# Patient Record
Sex: MALE | Race: BLACK OR AFRICAN AMERICAN | NOT HISPANIC OR LATINO | Employment: FULL TIME | URBAN - METROPOLITAN AREA
[De-identification: names, ages, dates, MRNs, and addresses within clinical notes are randomized per-mention and may not be internally consistent; named-entity substitution may affect disease eponyms.]

---

## 2021-08-04 ENCOUNTER — LAB VISIT (OUTPATIENT)
Dept: FAMILY MEDICINE | Facility: CLINIC | Age: 36
End: 2021-08-04
Payer: OTHER GOVERNMENT

## 2021-08-04 DIAGNOSIS — R05.9 COUGH: ICD-10-CM

## 2021-08-04 PROCEDURE — U0003 INFECTIOUS AGENT DETECTION BY NUCLEIC ACID (DNA OR RNA); SEVERE ACUTE RESPIRATORY SYNDROME CORONAVIRUS 2 (SARS-COV-2) (CORONAVIRUS DISEASE [COVID-19]), AMPLIFIED PROBE TECHNIQUE, MAKING USE OF HIGH THROUGHPUT TECHNOLOGIES AS DESCRIBED BY CMS-2020-01-R: HCPCS | Performed by: INTERNAL MEDICINE

## 2021-08-04 PROCEDURE — U0005 INFEC AGEN DETEC AMPLI PROBE: HCPCS | Performed by: INTERNAL MEDICINE

## 2021-08-05 LAB
SARS-COV-2 RNA RESP QL NAA+PROBE: NOT DETECTED
SARS-COV-2- CYCLE NUMBER: -1

## 2022-12-16 ENCOUNTER — HOSPITAL ENCOUNTER (EMERGENCY)
Facility: HOSPITAL | Age: 37
Discharge: HOME | End: 2022-12-16
Attending: EMERGENCY MEDICINE

## 2022-12-16 VITALS
HEIGHT: 70 IN | TEMPERATURE: 98 F | DIASTOLIC BLOOD PRESSURE: 51 MMHG | WEIGHT: 165 LBS | BODY MASS INDEX: 23.62 KG/M2 | RESPIRATION RATE: 18 BRPM | OXYGEN SATURATION: 100 % | HEART RATE: 61 BPM | SYSTOLIC BLOOD PRESSURE: 112 MMHG

## 2022-12-16 DIAGNOSIS — R10.30 LOWER ABDOMINAL PAIN: ICD-10-CM

## 2022-12-16 DIAGNOSIS — K40.90 RIGHT INGUINAL HERNIA: Primary | ICD-10-CM

## 2022-12-16 LAB
ALBUMIN SERPL BCP-MCNC: 4.2 G/DL (ref 3.5–5.2)
ALP SERPL-CCNC: 49 U/L (ref 55–135)
ALT SERPL W/O P-5'-P-CCNC: 12 U/L (ref 10–44)
ANION GAP SERPL CALC-SCNC: 8 MMOL/L (ref 8–16)
AST SERPL-CCNC: 15 U/L (ref 10–40)
BILIRUB SERPL-MCNC: 1 MG/DL (ref 0.1–1)
BUN SERPL-MCNC: 15 MG/DL (ref 6–20)
CALCIUM SERPL-MCNC: 9.1 MG/DL (ref 8.7–10.5)
CHLORIDE SERPL-SCNC: 106 MMOL/L (ref 95–110)
CO2 SERPL-SCNC: 25 MMOL/L (ref 23–29)
CREAT SERPL-MCNC: 1.1 MG/DL (ref 0.5–1.4)
ERYTHROCYTE [DISTWIDTH] IN BLOOD BY AUTOMATED COUNT: 16.1 % (ref 11.5–14.5)
EST. GFR  (NO RACE VARIABLE): >60 ML/MIN/1.73 M^2
GLUCOSE SERPL-MCNC: 101 MG/DL (ref 70–110)
HCT VFR BLD AUTO: 42.3 % (ref 40–54)
HGB BLD-MCNC: 13.4 G/DL (ref 14–18)
LACTATE SERPL-SCNC: 1.4 MMOL/L (ref 0.5–2.2)
LIPASE SERPL-CCNC: 19 U/L (ref 4–60)
MCH RBC QN AUTO: 21.8 PG (ref 27–31)
MCHC RBC AUTO-ENTMCNC: 31.7 G/DL (ref 32–36)
MCV RBC AUTO: 69 FL (ref 82–98)
PLATELET # BLD AUTO: 357 K/UL (ref 150–450)
PMV BLD AUTO: 10.2 FL (ref 9.2–12.9)
POTASSIUM SERPL-SCNC: 4.4 MMOL/L (ref 3.5–5.1)
PROT SERPL-MCNC: 7.6 G/DL (ref 6–8.4)
RBC # BLD AUTO: 6.15 M/UL (ref 4.6–6.2)
SODIUM SERPL-SCNC: 139 MMOL/L (ref 136–145)
WBC # BLD AUTO: 9.78 K/UL (ref 3.9–12.7)

## 2022-12-16 PROCEDURE — 83690 ASSAY OF LIPASE: CPT | Performed by: EMERGENCY MEDICINE

## 2022-12-16 PROCEDURE — 25500020 PHARM REV CODE 255: Performed by: EMERGENCY MEDICINE

## 2022-12-16 PROCEDURE — 96361 HYDRATE IV INFUSION ADD-ON: CPT

## 2022-12-16 PROCEDURE — 85027 COMPLETE CBC AUTOMATED: CPT | Performed by: EMERGENCY MEDICINE

## 2022-12-16 PROCEDURE — 83605 ASSAY OF LACTIC ACID: CPT | Performed by: EMERGENCY MEDICINE

## 2022-12-16 PROCEDURE — 96374 THER/PROPH/DIAG INJ IV PUSH: CPT

## 2022-12-16 PROCEDURE — 80053 COMPREHEN METABOLIC PANEL: CPT | Performed by: EMERGENCY MEDICINE

## 2022-12-16 PROCEDURE — 63600175 PHARM REV CODE 636 W HCPCS: Performed by: EMERGENCY MEDICINE

## 2022-12-16 PROCEDURE — 99285 EMERGENCY DEPT VISIT HI MDM: CPT | Mod: 25

## 2022-12-16 PROCEDURE — 96375 TX/PRO/DX INJ NEW DRUG ADDON: CPT

## 2022-12-16 PROCEDURE — 25000003 PHARM REV CODE 250: Performed by: EMERGENCY MEDICINE

## 2022-12-16 RX ORDER — MORPHINE SULFATE 4 MG/ML
4 INJECTION, SOLUTION INTRAMUSCULAR; INTRAVENOUS
Status: COMPLETED | OUTPATIENT
Start: 2022-12-16 | End: 2022-12-16

## 2022-12-16 RX ORDER — ONDANSETRON 2 MG/ML
4 INJECTION INTRAMUSCULAR; INTRAVENOUS
Status: COMPLETED | OUTPATIENT
Start: 2022-12-16 | End: 2022-12-16

## 2022-12-16 RX ADMIN — IOHEXOL 75 ML: 350 INJECTION, SOLUTION INTRAVENOUS at 02:12

## 2022-12-16 RX ADMIN — MORPHINE SULFATE 4 MG: 4 INJECTION INTRAVENOUS at 12:12

## 2022-12-16 RX ADMIN — SODIUM CHLORIDE 1000 ML: 0.9 INJECTION, SOLUTION INTRAVENOUS at 12:12

## 2022-12-16 RX ADMIN — ONDANSETRON 4 MG: 2 INJECTION INTRAMUSCULAR; INTRAVENOUS at 12:12

## 2022-12-16 NOTE — ED NOTES
Patient rates his pain as a 0/10 after receiving IV morphine. Patient denies any nausea at this time. Patient lying in bed, resting comfortably. O2 monitoring in place. Call light in reach, ED visitor @ bedside.

## 2022-12-16 NOTE — DISCHARGE INSTRUCTIONS
Your exam today showed you have an inguinal hernia that reduced (went back in) without need for surgery. If you would like to schedule surgery to prevent the hernia from coming back, call the number to make an appointment.  Come back to the ED if you notice the hernia comes back out and it unable to be pushed back in, for severe abdominal pain, fever, uncontrollable vomiting, or any other concerns.    Thank you for choosing [facility]!     Our goal in the Emergency Department is to always provide outstanding medical care. You may receive a survey by mail or e-mail in the next week regarding your experience today. We would greatly appreciate you completing and returning the survey. Your feedback provides us with a way to recognize our staff who provide very good care, and it helps us learn how to improve when your experience was below our aspiration of excellence.      It is important to remember that some problems are difficult to diagnose and may not be found during your first visit. Be sure to follow up with your primary care doctor and review any labs/imaging that was performed during your visit with them. If you do not have a primary care doctor, you may contact the one listed on your discharge paperwork, or you may also call the [facility] Appointment Desk at [phone] to schedule an appointment.     All medications may potentially have side effects and it is impossible to predict which medications may give you side effects. If you feel that you are having a negative effect of any medication you should immediately stop taking them and seek medical attention.  Do not drive or make any important decisions for 24 hours if you have received any pain medications, sedatives or mood altering drugs during your ER visit.    We appreciate you trusting us with your medical care. We will be happy to take care of you for all of your future medical needs. You may return to the ER at any time for any  new/concerning symptoms, worsening condition, or failure to improve. We hope you feel better soon.     Sincerely,    Kristian Olmos Jr., MD  Board-Certified Emergency Medicine Physician  [facility]

## 2022-12-16 NOTE — ED PROVIDER NOTES
"Encounter Date: 12/16/2022       History     Chief Complaint   Patient presents with    Groin Pain     Right groin pain with " lump" that began 30  in ago, denies any heavy lifting, increased pain with walking      Johnnie Hinton is a 37 y.o. male who  has a past medical history of Asthma.    The patient presents to the ED due to abdominal pain and swelling.   Patient states the pain and swelling started suddenly about 30 min prior to arrival to the ED.  He denies any prior similar episodes. No associated vomiting or fever but does endorse nausea. No prior abdominal surgery. No recent injury. No heavy lifting or strenuous activity.  Endorses allergy to penicillin. No meds taken prior to arrival.       Review of patient's allergies indicates:   Allergen Reactions    Penicillins Hives     Past Medical History:   Diagnosis Date    Asthma      No past surgical history on file.  Family History   Problem Relation Age of Onset    Prostate cancer Neg Hx     Kidney disease Neg Hx      Social History     Tobacco Use    Smoking status: Never   Substance Use Topics    Alcohol use: No    Drug use: No     Review of Systems   Constitutional:  Negative for chills and fever.   HENT:  Negative for sore throat.    Respiratory:  Negative for shortness of breath.    Cardiovascular:  Negative for chest pain.   Gastrointestinal:  Positive for abdominal pain and nausea. Negative for constipation, diarrhea and vomiting.   Genitourinary:  Negative for dysuria, frequency and urgency.   Musculoskeletal:  Negative for back pain.   Skin:  Negative for rash and wound.   Neurological:  Negative for weakness.   Hematological:  Does not bruise/bleed easily.   Psychiatric/Behavioral:  Negative for agitation, behavioral problems and confusion.      Physical Exam     Initial Vitals [12/16/22 1036]   BP Pulse Resp Temp SpO2   (!) 111/52 (!) 56 18 97.5 °F (36.4 °C) 100 %      MAP       --         Physical Exam    Nursing note and vitals " reviewed.  Constitutional: He appears well-developed and well-nourished. He is not diaphoretic. No distress.   HENT:   Head: Normocephalic and atraumatic.   Mouth/Throat: Oropharynx is clear and moist.   Eyes: EOM are normal. Pupils are equal, round, and reactive to light.   Neck: No tracheal deviation present.   Cardiovascular:  Normal rate, regular rhythm, normal heart sounds and intact distal pulses.           Pulmonary/Chest: Breath sounds normal. No stridor. No respiratory distress.   Abdominal: Abdomen is soft. He exhibits no distension and no mass. There is generalized abdominal tenderness. A hernia is present. Hernia confirmed positive in the right inguinal area.   Suprapubic tenderness and bulge.  There is guarding.   Musculoskeletal:         General: No edema. Normal range of motion.     Neurological: He is alert and oriented to person, place, and time. No cranial nerve deficit or sensory deficit.   Skin: Skin is warm and dry. Capillary refill takes less than 2 seconds. No rash noted.   Psychiatric: He has a normal mood and affect. His behavior is normal. Thought content normal.       ED Course   Procedures  Labs Reviewed   CBC WITHOUT DIFFERENTIAL - Abnormal; Notable for the following components:       Result Value    Hemoglobin 13.4 (*)     MCV 69 (*)     MCH 21.8 (*)     MCHC 31.7 (*)     RDW 16.1 (*)     All other components within normal limits   COMPREHENSIVE METABOLIC PANEL - Abnormal; Notable for the following components:    Alkaline Phosphatase 49 (*)     All other components within normal limits   LIPASE   LACTIC ACID, PLASMA          Imaging Results              CT Abdomen Pelvis With Contrast (Final result)  Result time 12/16/22 14:47:46      Final result by Niles Mejia MD (12/16/22 14:47:46)                   Impression:      1. Diffuse circumferential urinary bladder wall thickening, findings are concerning for cystitis however correlation with any history of outlet obstruction.   Correlation with urinalysis advised.  2. Moderate stool in the colon, could reflect developing constipation.  3. Hepatic steatosis, correlation with LFTs recommended.  4. Additional findings above.      Electronically signed by: Niles Mejia MD  Date:    12/16/2022  Time:    14:47               Narrative:    EXAMINATION:  CT ABDOMEN PELVIS WITH CONTRAST    CLINICAL HISTORY:  Abdominal abscess/infection suspected;Bowel obstruction suspected;    TECHNIQUE:  Low dose axial images, sagittal and coronal reformations were obtained from the lung bases to the pubic symphysis following the IV administration of 75 mL of Omnipaque 350 .  Oral contrast was not given.    COMPARISON:  None.    FINDINGS:  Motion artifact limits evaluation of the abdomen pelvis.    Images of the lower thorax are grossly unremarkable.    The liver is enlarged, correlation with LFTs recommended.  The spleen, pancreas, gallbladder and adrenal glands are unremarkable.  There is no biliary dilation or ascites.  The stomach is nondistended.  The portal vein, splenic vein, SMV, celiac axis and SMA all are patent.  No significant abdominal lymphadenopathy.    The kidneys enhance symmetrically without hydronephrosis or nephrolithiasis.  The bilateral ureters are unremarkable without calculi seen.  The urinary bladder is decompressed noting diffuse urinary bladder wall thickening and indistinctness about the urinary bladder.  The prostate is not enlarged.    There are a few scattered colonic diverticula without inflammation.  The terminal ileum is unremarkable.  The appendix is unremarkable.  The small bowel is grossly unremarkable.  Several scattered shotty periaortic and paracaval lymph nodes noted.  There are a few scattered upper limit of normal caliber mesenteric lymph nodes.    No acute osseous abnormality.  No significant inguinal lymphadenopathy.                                       Medications   sodium chloride 0.9% bolus 1,000 mL 1,000 mL (0  mLs Intravenous Stopped 12/16/22 1329)   morphine injection 4 mg (4 mg Intravenous Given 12/16/22 1218)   ondansetron injection 4 mg (4 mg Intravenous Given 12/16/22 1218)   iohexoL (OMNIPAQUE 350) injection 75 mL (75 mLs Intravenous Given 12/16/22 1430)     Medical Decision Making:   History:   Old Medical Records: I decided to obtain old medical records.  Old Records Summarized: records from clinic visits and other records.       <> Summary of Records: Phimosis surgery in 2016.  Initial Assessment:   36 yo M with lower abdominal pain and swelling.  Exam consistent with inguinal hernia.  Will obtain labs, CT A/P, treat pain, and reassess.   Differential Diagnosis:   Differential Diagnosis includes, but is not limited to:  AAA, aortic dissection, mesenteric ischemia, perforated viscous, MI/ACS, SBO/volvulus, incarcerated/strangulated hernia, intussusception, ileus, appendicitis, cholecystitis, cholangitis, diverticulitis, esophagitis, hepatitis, nephrolithiasis, pancreatitis, gastroenteritis, colitis, IBD/IBS, biliary colic, GERD, PUD, constipation, UTI/pyelonephritis,  disorder.      Clinical Tests:   Lab Tests: Reviewed and Ordered  Radiological Study: Ordered and Reviewed    On re-evaluation, the patient's status has improved.  After complete ED evaluation, clinical impression is most consistent with inguinal hernia.  PCP/Surgery follow-up within 2-3 days was recommended.    After taking into careful account the patient's history, physical exam findings, as well as empirical and objective data obtained throughout ED workup, I feel no emergent medical condition has been identified. No further evaluation or admission was felt to be required, and the patient is stable for discharge from the ED. The patient and any additional family present were updated with test results, overall clinical impression, and recommended further plan of care, including discharge instructions as provided and outpatient follow-up for  continued evaluation and management as needed. All questions were answered. The patient expressed understanding and agreed with current plan for discharge and follow-up plan of care. Strict ED return precautions were provided, including return/worsening of current symptoms, new symptoms, or any other concerns.               ED Course as of 12/17/22 0857   Fri Dec 16, 2022   1524 CBC Without Differential(!)  Unremarkable.  [SS]   1524 Lactic Acid, Plasma  WNL. [SS]   1524 Lipase  WNL. [SS]   1524 Comprehensive metabolic panel(!)  WNL.  [SS]   1524 Hernia spontaneously reduced on reassessment. CT A/P reveals no acute process, no bowel obstruction.   Patient well-appearing, denies any pain. Offered Surgery referral, patient not interested in surgery at this time. Counseled on symptomatic and supportive care of hernia. Given strict return precautions.  [SS]      ED Course User Index  [SS] Kristian Olmos MD                 Clinical Impression:   Final diagnoses:  [K40.90] Right inguinal hernia (Primary)  [R10.30] Lower abdominal pain        ED Disposition Condition    Discharge Stable          ED Prescriptions    None       Follow-up Information       Follow up With Specialties Details Why Contact Info Additional Information    [facility] General Surgery Surgery Schedule an appointment as soon as possible for a visit   [address], [address]  [facility] [address] [phone] Please park in [address] or [address] and use [address]el callahan. Take Medical Office Bldg elevators.             Kristian Olmos MD  12/17/22 0857

## 2022-12-16 NOTE — Clinical Note
"Johnnie Evansblayne Hinton was seen and treated in our emergency department on 12/16/2022.  He may return to work on 12/19/2022.       If you have any questions or concerns, please don't hesitate to call.      HUGH De Jesus    "

## 2022-12-16 NOTE — ED NOTES
Patient presents to ED from work with complaints of groin pain that began 1 hour ago. Patient states he was just walking around at work when he developed an acute pain to the right side of his groin. Patient denies any trauma, heavy lifting, or injury to groin. Patient states pain is increased with standing and movement. Patient AAOx4 and ambulatory, denies chest pain or SOB. Patient rates right groin pain 6/10 at this time and denies taking any medication for the pain.